# Patient Record
Sex: FEMALE | Race: WHITE | Employment: PART TIME | URBAN - NONMETROPOLITAN AREA
[De-identification: names, ages, dates, MRNs, and addresses within clinical notes are randomized per-mention and may not be internally consistent; named-entity substitution may affect disease eponyms.]

---

## 2017-04-12 ENCOUNTER — OFFICE VISIT (OUTPATIENT)
Dept: ORTHOPEDIC SURGERY | Age: 27
End: 2017-04-12

## 2017-04-12 VITALS — WEIGHT: 105 LBS | BODY MASS INDEX: 17.93 KG/M2 | HEIGHT: 64 IN

## 2017-04-12 DIAGNOSIS — M22.11 PATELLOFEMORAL INSTABILITY, RIGHT: Primary | ICD-10-CM

## 2017-04-12 PROCEDURE — 99215 OFFICE O/P EST HI 40 MIN: CPT | Performed by: ORTHOPAEDIC SURGERY

## 2017-04-12 PROCEDURE — L1820 KO ELAS W/ CONDYLE PADS & JO: HCPCS | Performed by: ORTHOPAEDIC SURGERY

## 2017-04-12 RX ORDER — PROPRANOLOL HYDROCHLORIDE 80 MG/1
CAPSULE, EXTENDED RELEASE ORAL
COMMUNITY
Start: 2017-04-11

## 2017-04-13 ENCOUNTER — TELEPHONE (OUTPATIENT)
Dept: ORTHOPEDIC SURGERY | Age: 27
End: 2017-04-13

## 2017-04-26 ENCOUNTER — OFFICE VISIT (OUTPATIENT)
Dept: ORTHOPEDIC SURGERY | Age: 27
End: 2017-04-26

## 2017-04-26 ENCOUNTER — TELEPHONE (OUTPATIENT)
Dept: ORTHOPEDIC SURGERY | Age: 27
End: 2017-04-26

## 2017-04-26 DIAGNOSIS — M22.2X1 PATELLOFEMORAL STRESS SYNDROME OF RIGHT KNEE: Primary | ICD-10-CM

## 2017-04-26 PROCEDURE — 99214 OFFICE O/P EST MOD 30 MIN: CPT | Performed by: ORTHOPAEDIC SURGERY

## 2017-04-26 RX ORDER — MELOXICAM 15 MG/1
15 TABLET ORAL DAILY
Qty: 30 TABLET | Refills: 3 | Status: SHIPPED | OUTPATIENT
Start: 2017-04-26 | End: 2022-01-13

## 2017-06-09 ENCOUNTER — TELEPHONE (OUTPATIENT)
Dept: ORTHOPEDIC SURGERY | Age: 27
End: 2017-06-09

## 2017-06-21 ENCOUNTER — TELEPHONE (OUTPATIENT)
Dept: ORTHOPEDIC SURGERY | Age: 27
End: 2017-06-21

## 2017-10-13 ENCOUNTER — TELEPHONE (OUTPATIENT)
Dept: ORTHOPEDIC SURGERY | Age: 27
End: 2017-10-13

## 2017-10-13 NOTE — TELEPHONE ENCOUNTER
Scanned [facility] medical records for [address] [facility] for 1990 to present into MRO for [address] [facility]

## 2018-02-15 ENCOUNTER — PROCEDURE VISIT (OUTPATIENT)
Dept: NEUROLOGY | Age: 28
End: 2018-02-15

## 2018-02-15 DIAGNOSIS — M79.601 RIGHT ARM PAIN: Primary | ICD-10-CM

## 2018-02-15 PROCEDURE — 95909 NRV CNDJ TST 5-6 STUDIES: CPT | Performed by: PSYCHIATRY & NEUROLOGY

## 2018-02-15 PROCEDURE — 95886 MUSC TEST DONE W/N TEST COMP: CPT | Performed by: PSYCHIATRY & NEUROLOGY

## 2018-02-15 NOTE — PROGRESS NOTES
Demond Palacio M.D. [facility]) Physicians/[facility]  Board Certified in [address], [address]    EMG / NERVE CONDUCTION STUDY    PATIENT:     Norman Kearns      DATE OF EM/15/2018    YOB: 1990       REASON FOR EMG:  Right arm pain  for 2 weeks. Patient denies any symptoms in the left arm. REFERRING PHYSICIAN:  Apolonia Overton PA-C  [address] [facility], [address]    SUMMARY:  The right median motor and sensory nerve studies were normal  The right ulnar motor and sensory nerve studies were normal  The right radial sensory nerve study was normal  Needle EMG of several muscles in the right upper extremity was normal     CLINICAL DIAGNOSIS:  Unspecified neuropathy     EMG RESULTS:   This is a normal EMG and nerve conduction study of the right upper extremity. I'm unable to find any electrophysiological evidence for radial mononeuropathy or other mononeuropathy in this study. _____________________________  Demond Palacio M.D.   Electromyographer/Neurologist

## 2020-01-22 ENCOUNTER — OFFICE VISIT (OUTPATIENT)
Dept: ORTHOPEDIC SURGERY | Age: 30
End: 2020-01-22

## 2020-01-22 VITALS — WEIGHT: 105 LBS | BODY MASS INDEX: 17.93 KG/M2 | HEIGHT: 64 IN

## 2020-01-22 PROCEDURE — 99204 OFFICE O/P NEW MOD 45 MIN: CPT | Performed by: ORTHOPAEDIC SURGERY

## 2020-01-22 NOTE — PROGRESS NOTES
Social connections:     Talks on phone: None     Gets together: None     Attends [employer] service: None     Active member of club or organization: None     Attends meetings of clubs or organizations: None     Relationship status: None    Intimate partner violence:     Fear of current or ex partner: None     Emotionally abused: None     Physically abused: None     Forced sexual activity: None   Other Topics Concern    None   Social History Narrative    None     Current Outpatient Medications   Medication Sig Dispense Refill    meloxicam (MOBIC) 15 MG tablet Take 1 tablet by mouth daily 30 tablet 3    propranolol (INDERAL LA) 80 MG extended release capsule       baclofen (LIORESAL) 20 MG tablet       busPIRone (BUSPAR) 15 MG tablet       cyclobenzaprine (FLEXERIL) 10 MG tablet       FLUoxetine (PROZAC) 40 MG capsule       omeprazole (PRILOSEC) 20 MG capsule       QUEtiapine (SEROQUEL) 25 MG tablet       VENTOLIN  (90 BASE) MCG/ACT inhaler       amitriptyline (ELAVIL) 75 MG tablet       ARIPiprazole (ABILIFY) 5 MG tablet       azithromycin (ZITHROMAX) 250 MG tablet       baclofen (LIORESAL) 10 MG tablet       benzonatate (TESSALON) 100 MG capsule       busPIRone (BUSPAR) 7.5 MG tablet       DULoxetine (CYMBALTA) 30 MG capsule       fexofenadine (ALLEGRA) 180 MG tablet       fluticasone (FLONASE) 50 MCG/ACT nasal spray       gabapentin (NEURONTIN) 300 MG capsule       guaiFENesin (MUCINEX) 600 MG SR tablet       ibuprofen (ADVIL;MOTRIN) 800 MG tablet       ketorolac (TORADOL) 10 MG tablet       levofloxacin (LEVAQUIN) 500 MG tablet       trimethoprim-polymyxin b (POLYTRIM) 67700-9.1 UNIT/ML-% ophthalmic solution       predniSONE (DELTASONE) 20 MG tablet       promethazine (PHENERGAN) 25 MG tablet       propranolol (INDERAL LA) 60 MG CR capsule       brompheniramine-pseudoephedrine-DM 30-2-10 MG/5ML syrup       FLUoxetine (PROZAC) 20 MG capsule Take 60 mg by mouth daily      deficit. Skin:    Head/Neck: inspection reveals no rashes, ulcerations or lesions. Trunk:  inspection reveals no rashes, ulcerations or lesions. Right Upper Extremity: inspection reveals no rashes, ulcerations or lesions. Left Upper Extremity: inspection reveals no rashes, ulcerations or lesions. PHYSICAL EXAM:      Shoulder Examination  Inspection:  No obvious deformity, no erythema, no abrasions or lacerations, no obvious muscle atrophy. Palpation:  Lateral deltoid no pain to palpation  AC joint no no pain to palopation  No pain Anterior to palpation  No pain Posterior to palpation  No trapezial pain to palpation  Range of Motion:  Abduction --150 degrees  Flexion-- 180 degrees  Extension-- between 45-60 degrees  Latera/external  rotation --close to 90 degrees  Medial/ internal rotation -- between 70-90 degrees    Strength: Right shoulder strength:   internal rotation against resistance is 5/5  external rotation against resistance is 5/5  and supraspinatus isolation against resistance is 5/5, Shoulder shrug is 5 over 5 , cervical spine strength is excellent, flexion extension at the elbow is 5 over 5 wrist and hand strength is equal bilaterally with supination pronation and flexion and extension  no winging no muscle atrophy. Special Tests:  Palpation demonstrates no swelling no effusion moderate pain. There is full active and passive range of motion. Strength is intact with internal rotation against resistance external rotation against resistance supraspinatus isolation against resistance. Shoulder shrug strength is 5 over 5 equal bilaterally. Radial ulnar and median nerve function is intact. Capillary refill is brisk. Sensation is intact from neck down to the fingers. Elbow motion finger and wrist motion is full equal bilaterally. Deep tendon reflexes of the Brachial radialis, biceps, tricepsAre all +2/4 equal bilaterally.   Cervical spine range of motion is limited with herniation  Labs: None          History reviewed. No pertinent surgical history. .    Office Procedures:  Orders Placed This Encounter   Procedures    XR SHOULDER RIGHT (MIN 2 VIEWS)     Standing Status:   Future     Standing Expiration Date:   1/22/2021    XR CERVICAL SPINE (2-3 VIEWS)     Standing Status:   Future     Number of Occurrences:   1     Standing Expiration Date:   1/22/2021       Previous Treatments: She was seen by neurology for a Chiari malformation, X-ray, anti-inflammatories,    Differential Diagnoses: Impingement, AC joint osteoarthritis,  Rotator cuff tear, Labral tear, Instability, loose body,  Long head of bicep injury,  Glenohumeral osteoarthritis, AC joint separation, SLAP tear, Posterior labral tear, Anterior Labral tear, neck pathology, brachioplexis injury, muscle injury, neck radiculopathy, bone tumor, fracture,    Diagnosis cervical spine radiculopathy        Plan: (Medical Decision Making)    1. Medications - none at this time  2. PT -in the future  3. Further imaging -MRI  4. Follow up -after MRI      Norman Escobar. OSEI Zapata. [facility] and Sports Medicine  Sports Fellowship Trained  Board Certified  Ingrid and Zaria Team Physician      Disclaimer: \"This note was dictated with voice recognition software. Though review and correction are routine, we apologize for any errors. \"

## 2020-06-03 ENCOUNTER — OFFICE VISIT (OUTPATIENT)
Dept: ORTHOPEDIC SURGERY | Age: 30
End: 2020-06-03

## 2020-06-03 VITALS — WEIGHT: 105 LBS | BODY MASS INDEX: 17.93 KG/M2 | HEIGHT: 64 IN | TEMPERATURE: 97.4 F

## 2020-06-03 PROBLEM — M54.2 NECK PAIN: Status: ACTIVE | Noted: 2020-06-03

## 2020-06-03 PROCEDURE — 99214 OFFICE O/P EST MOD 30 MIN: CPT | Performed by: ORTHOPAEDIC SURGERY

## 2020-06-03 NOTE — PROGRESS NOTES
6/3/20  History of Present Illness:  Skyla Jain is a 31 y.o. female seen today for cervical spine radiculopathy in the right upper extremity mostly trapezial area    Chief complaint that brought the patient in the office today: Right cervical radiculopathy      Location right cervical radiculopathy  Severity moderate  Duration been going on for many years  Associated sign/symptoms pain, trouble using overhead activity trouble sleeping    Medical History  Patient's medications, allergies, past medical, surgical, social and family histories were reviewed and updated as appropriate. 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   Social History     Socioeconomic History    Marital status: Single     Spouse name: None    Number of children: None    Years of education: None    Highest education level: None   Occupational History    None   Social Needs    Financial resource strain: None    Food insecurity     Worry: None     Inability: None    Transportation needs     Medical: None     Non-medical: None   Tobacco Use    Smoking status: Never Smoker    Smokeless tobacco: Never Used   Substance and Sexual Activity    Alcohol use: No    Drug use: None    Sexual activity: None   Lifestyle    Physical activity     Days per week: None     Minutes per session: None    Stress: None   Relationships    Social connections     Talks on phone: None     Gets together: None     Attends Religion service: None     Active member of club or organization: None     Attends meetings of clubs or organizations: None     Relationship status: None    Intimate partner violence     Fear of current or ex partner: None     Emotionally abused: None     Physically abused: None     Forced sexual activity: None   Other Topics Concern    None   Social bilateral upper extremities. · Special Tests:   Spurling's and Quintero's are negative bilaterally. Henry and Impingement tests are negative bilaterally. · Skin:There are no rashes, ulcerations or lesions. · Reflexes: Bilaterally triceps, biceps and brachioradialis are 2+. Clonus absent bilaterally at the feet. No pathological reflexes are noted. · Gait & station:  normal, patient ambulates without assistance and no ataxia    · Additional Examinations:    · RIGHT UPPER EXTREMITY:  Inspection/examination of the right upper extremity does not show any tenderness, deformity or injury. Range of motion is normal and pain-free. There is no gross instability. There are no rashes, ulcerations or lesions. Strength and tone are normal. No atrophy or abnormal movements are noted. · LEFT UPPER EXTREMITY: Inspection/examination of the left upper extremity does not show any tenderness, deformity or injury. Range of motion is normal and pain-free. There is no gross instability. There are no rashes, ulcerations or lesions. Strength and tone are normal. No atrophy or abnormal movements are noted.       Associated signs and symptoms:   Neurogenic bowel or bladder symptoms:  no   Perceived weakness:  yes   Difficulty walking:  yes    Additional Comments:      Cervical spine pain and right upper trapezial pain        Diagnostic Testing:    Views MRI multiple views and I independently reviewed these films today in my office, Body Part cervical spine impression Preliminary x-ray interpretation by No att. providers found independently, in the absence of radiologist (Final interpretation by radiologist to follow):   Traits mild disc at 5 or 6 more on the left very mild at 3 4 with no nerve root impingement I think most of her symptomatology is coming from C4-C5 with a disc protrusion and some spur formation right nerve root impingement with some mild in nature but it is there              Impression:{Blank single:070390::,\"no The indications for additional imaging/laboratory studies. The indications for (possible future) interventions. After considering the various options discussed, Aneesh Means elected to pursue a course of treatment that includes the followin. Medications: No further recommendations for new medications. 2. PT:  ,\"I will start the patient on a trial of PT to work on a cervical stabilization program to focus on stretching, strengthening, traction and modalities as indicated. Think she would do very well with cervical spine physical therapy for range of motion, manipulation, traction, strengthening, but she says she would not go to physical therapy she does not want to do anything to help this she just wanted to know what was causing it we discussed this I cannot make her do what she does not want to do and I will see her back as needed    3. Further studies: No further studies. 4. Interventional:  {Blank single:51780::\"After further imaging is obtained, interventional options will be reviewed and     5. Healthy Lifestyle Measures:  Patient education material reviewing the following was distributed to [address] [place] [address] lifestyle education  Back and neck pain educational information   Advanced imaging preparedness    Posture education   Weight Management discussed    For further information regarding the spine conditions and to review interventional treatments the patient was directed to [facility].    6.  Follow up:  as needed    Aneesh Means was instructed to call the office if her symptoms worsen or if new symptoms appear prior to the next scheduled visit. She is specifically instructed to contact the office between now & her scheduled appointment if she has concerns related to her condition or if she needs assistance in scheduling the above tests. She is   welcome to call for an appointment sooner if she has any additional concerns or questions. Caffie Ahumada Hess,

## 2022-01-13 ENCOUNTER — OFFICE VISIT (OUTPATIENT)
Dept: CARDIOLOGY CLINIC | Age: 32
End: 2022-01-13
Payer: COMMERCIAL

## 2022-01-13 VITALS
SYSTOLIC BLOOD PRESSURE: 102 MMHG | DIASTOLIC BLOOD PRESSURE: 60 MMHG | WEIGHT: 104.2 LBS | HEART RATE: 87 BPM | HEIGHT: 64 IN | BODY MASS INDEX: 17.79 KG/M2

## 2022-01-13 DIAGNOSIS — R07.82 INTERCOSTAL PAIN: Primary | ICD-10-CM

## 2022-01-13 PROBLEM — I49.9 IRREGULAR HEART RATE: Status: ACTIVE | Noted: 2022-01-13

## 2022-01-13 PROBLEM — R94.31 ABNORMAL EKG: Status: ACTIVE | Noted: 2022-01-13

## 2022-01-13 PROCEDURE — G8419 CALC BMI OUT NRM PARAM NOF/U: HCPCS | Performed by: INTERNAL MEDICINE

## 2022-01-13 PROCEDURE — G8427 DOCREV CUR MEDS BY ELIG CLIN: HCPCS | Performed by: INTERNAL MEDICINE

## 2022-01-13 PROCEDURE — 1036F TOBACCO NON-USER: CPT | Performed by: INTERNAL MEDICINE

## 2022-01-13 PROCEDURE — 93000 ELECTROCARDIOGRAM COMPLETE: CPT | Performed by: INTERNAL MEDICINE

## 2022-01-13 PROCEDURE — 99203 OFFICE O/P NEW LOW 30 MIN: CPT | Performed by: INTERNAL MEDICINE

## 2022-01-13 PROCEDURE — G8484 FLU IMMUNIZE NO ADMIN: HCPCS | Performed by: INTERNAL MEDICINE

## 2022-01-13 NOTE — PATIENT INSTRUCTIONS
Your ekg today looks normal  You probably had some inflammation in your rib/chest area  No further testing is needed at this time  Call us if chest pain returns

## 2022-01-13 NOTE — PROGRESS NOTES
[address]  Cardiac Consult     Referring Provider:  Ana Luisa Ho MD     Chief Complaint   Patient presents with    New Patient        History of Present Illness:  27 y.o. female with anxiety and depression seen as a new patient for chest pain. She was seen in the [facility] ER 12/20/2022 with chest pain. Sharp pain in left upper chest into her left arm. Exacerbated by movement or inspiration. CXR  and D-dimer negative. Diagnosed with costochondritis. Pain lasted 2-3 days and resolved. No associated symptoms. Now feels well with no chest pain or dyspnea. Past Medical History:   has a past medical history of Anxiety, Chiari I malformation ([address] Depression, EDS (T[place]on-Danlos syndrome), Temporomandibular joint disorder (TMJ), and Vertigo. Surgical History:   has no past surgical history on file. Social History:  Social History     Tobacco Use    Smoking status: Never Smoker    Smokeless tobacco: Never Used   Substance Use Topics    Alcohol use: No        Family History:  Negative for premature CAD    Allergies:  Amoxicillin     Home Medications:  Prior to Visit Medications    Medication Sig Taking?  Authorizing Provider   propranolol (INDERAL LA) 80 MG extended release capsule  Yes Historical Provider, MD   busPIRone (BUSPAR) 15 MG tablet  Yes Historical Provider, MD   cyclobenzaprine (FLEXERIL) 10 MG tablet  Yes Historical Provider, MD   FLUoxetine (PROZAC) 40 MG capsule  Yes Historical Provider, MD   gabapentin (NEURONTIN) 300 MG capsule  Yes Historical Provider, MD   ibuprofen (ADVIL;MOTRIN) 800 MG tablet  Yes Historical Provider, MD   promethazine (PHENERGAN) 25 MG tablet  Yes Historical Provider, MD   FLUoxetine (PROZAC) 20 MG capsule Take 60 mg by mouth daily Yes Historical Provider, MD   diazepam (VALIUM) 10 MG tablet Take 10 mg by mouth 2 times daily Yes Historical Provider, MD   HYDROcodone-acetaminophen (NORCO) 5-325 MG per tablet Take 1 tablet by mouth 2 times daily Yes Historical Provider, MD       [x] Medications and dosages reviewed.     ROS:  [x]Full ROS obtained and negative except as mentioned in HPI      Physical Examination:    Vitals:    01/13/22 1327   BP: 102/60   Site: Left Upper Arm   Position: Sitting   Cuff Size: Medium Adult   Pulse: 87   Weight: 104 lb 3.2 oz (47.3 kg)   Height: 5' 4\" (1.626 m)        · GENERAL: Well developed, well nourished, No acute distress  · NEUROLOGICAL: Alert and oriented  · PSYCH: Calm affect  · SKIN: Warm and dry, No visible rash,   · EYES: Pupils equal and round, Sclera non-icteric,   · HENT:  External ears and nose unremarkable, mucus membranes moist  · MUSCULOSKELETAL: Normal cephalic, neck supple  · CAROTID: Normal upstroke, no bruits  · CARDIAC: JVP normal, Normal PMI, regular rate and rhythm, normal S1S2, no murmur, rub, or gallop  · RESPIRATORY: Normal respiratory effort, clear to auscultation bilaterally  · EXTREMITIES: No edema  · GASTROINTESTINAL: normal bowel sounds, soft, non-tender, No hepatomegaly     EKG:   Normal sinus, normal    CXR 12/20/2022  Negative portable chest    D-Dimer <0.27      Assessment:     Chest Pain:  Non cardiac   Resolved  Normal EKG  No further cardiac testing indicated at this time  F/u prn  Call for any issues    Plan:  As above    Thank you for allowing me to participate in the care of this individual.      Anant Trevino M.D., [facility]